# Patient Record
Sex: FEMALE | Race: WHITE | NOT HISPANIC OR LATINO | Employment: UNEMPLOYED | URBAN - NONMETROPOLITAN AREA
[De-identification: names, ages, dates, MRNs, and addresses within clinical notes are randomized per-mention and may not be internally consistent; named-entity substitution may affect disease eponyms.]

---

## 2017-02-22 ENCOUNTER — HOSPITAL ENCOUNTER (OUTPATIENT)
Dept: GENERAL RADIOLOGY | Facility: HOSPITAL | Age: 67
Discharge: HOME | End: 2017-02-22
Admitting: NURSE PRACTITIONER

## 2017-02-22 ENCOUNTER — HOSPITAL ENCOUNTER (OUTPATIENT)
Dept: RESPIRATORY THERAPY | Facility: HOSPITAL | Age: 67
Discharge: HOME | End: 2017-02-22

## 2017-02-22 ENCOUNTER — TRANSCRIBE ORDERS (OUTPATIENT)
Dept: ADMINISTRATIVE | Facility: HOSPITAL | Age: 67
End: 2017-02-22

## 2017-02-22 DIAGNOSIS — R05.9 COUGH: ICD-10-CM

## 2017-02-22 DIAGNOSIS — R07.9 CHEST PAIN, UNSPECIFIED: ICD-10-CM

## 2017-02-22 DIAGNOSIS — R05.9 COUGH: Primary | ICD-10-CM

## 2017-02-22 PROCEDURE — 71020 HC CHEST PA AND LATERAL: CPT

## 2017-02-22 PROCEDURE — 93005 ELECTROCARDIOGRAM TRACING: CPT | Performed by: NURSE PRACTITIONER

## 2017-02-22 PROCEDURE — 71020 XR CHEST PA AND LATERAL: CPT | Performed by: RADIOLOGY

## 2020-02-12 ENCOUNTER — APPOINTMENT (OUTPATIENT)
Dept: GENERAL RADIOLOGY | Facility: HOSPITAL | Age: 70
End: 2020-02-12

## 2020-02-12 ENCOUNTER — APPOINTMENT (OUTPATIENT)
Dept: CT IMAGING | Facility: HOSPITAL | Age: 70
End: 2020-02-12

## 2020-02-12 ENCOUNTER — HOSPITAL ENCOUNTER (EMERGENCY)
Facility: HOSPITAL | Age: 70
Discharge: HOME | End: 2020-02-12
Attending: FAMILY MEDICINE | Admitting: FAMILY MEDICINE

## 2020-02-12 VITALS
OXYGEN SATURATION: 100 % | RESPIRATION RATE: 16 BRPM | TEMPERATURE: 98.7 F | SYSTOLIC BLOOD PRESSURE: 132 MMHG | WEIGHT: 170 LBS | BODY MASS INDEX: 31.28 KG/M2 | HEART RATE: 86 BPM | DIASTOLIC BLOOD PRESSURE: 67 MMHG | HEIGHT: 62 IN

## 2020-02-12 DIAGNOSIS — J10.1 INFLUENZA A: Primary | ICD-10-CM

## 2020-02-12 DIAGNOSIS — R91.8 PULMONARY NODULES: ICD-10-CM

## 2020-02-12 LAB
ALBUMIN SERPL-MCNC: 3.85 G/DL (ref 3.5–5.2)
ALBUMIN/GLOB SERPL: 1.2 G/DL
ALP SERPL-CCNC: 77 U/L (ref 39–117)
ALT SERPL W P-5'-P-CCNC: 10 U/L (ref 1–33)
ANION GAP SERPL CALCULATED.3IONS-SCNC: 16 MMOL/L (ref 5–15)
AST SERPL-CCNC: 17 U/L (ref 1–32)
B PERT DNA SPEC QL NAA+PROBE: NOT DETECTED
BASOPHILS # BLD AUTO: 0.01 10*3/MM3 (ref 0–0.2)
BASOPHILS NFR BLD AUTO: 0.2 % (ref 0–1.5)
BILIRUB SERPL-MCNC: 0.6 MG/DL (ref 0.2–1.2)
BUN BLD-MCNC: 13 MG/DL (ref 8–23)
BUN/CREAT SERPL: 9.7 (ref 7–25)
C PNEUM DNA NPH QL NAA+NON-PROBE: NOT DETECTED
CALCIUM SPEC-SCNC: 9.4 MG/DL (ref 8.6–10.5)
CHLORIDE SERPL-SCNC: 102 MMOL/L (ref 98–107)
CO2 SERPL-SCNC: 21 MMOL/L (ref 22–29)
CREAT BLD-MCNC: 1.34 MG/DL (ref 0.57–1)
CRP SERPL-MCNC: 0.91 MG/DL (ref 0–0.5)
DEPRECATED RDW RBC AUTO: 41.7 FL (ref 37–54)
EOSINOPHIL # BLD AUTO: 0 10*3/MM3 (ref 0–0.4)
EOSINOPHIL NFR BLD AUTO: 0 % (ref 0.3–6.2)
ERYTHROCYTE [DISTWIDTH] IN BLOOD BY AUTOMATED COUNT: 12.5 % (ref 12.3–15.4)
FLUAV H1 2009 PAND RNA NPH QL NAA+PROBE: DETECTED
FLUAV H1 HA GENE NPH QL NAA+PROBE: NOT DETECTED
FLUAV H3 RNA NPH QL NAA+PROBE: NOT DETECTED
FLUAV SUBTYP SPEC NAA+PROBE: NOT DETECTED
FLUBV RNA ISLT QL NAA+PROBE: NOT DETECTED
GFR SERPL CREATININE-BSD FRML MDRD: 39 ML/MIN/1.73
GLOBULIN UR ELPH-MCNC: 3.2 GM/DL
GLUCOSE BLD-MCNC: 127 MG/DL (ref 65–99)
HADV DNA SPEC NAA+PROBE: NOT DETECTED
HCOV 229E RNA SPEC QL NAA+PROBE: NOT DETECTED
HCOV HKU1 RNA SPEC QL NAA+PROBE: NOT DETECTED
HCOV NL63 RNA SPEC QL NAA+PROBE: NOT DETECTED
HCOV OC43 RNA SPEC QL NAA+PROBE: NOT DETECTED
HCT VFR BLD AUTO: 35.6 % (ref 34–46.6)
HGB BLD-MCNC: 11.8 G/DL (ref 12–15.9)
HMPV RNA NPH QL NAA+NON-PROBE: NOT DETECTED
HOLD SPECIMEN: NORMAL
HOLD SPECIMEN: NORMAL
HPIV1 RNA SPEC QL NAA+PROBE: NOT DETECTED
HPIV2 RNA SPEC QL NAA+PROBE: NOT DETECTED
HPIV3 RNA NPH QL NAA+PROBE: NOT DETECTED
HPIV4 P GENE NPH QL NAA+PROBE: NOT DETECTED
IMM GRANULOCYTES # BLD AUTO: 0.02 10*3/MM3 (ref 0–0.05)
IMM GRANULOCYTES NFR BLD AUTO: 0.4 % (ref 0–0.5)
LYMPHOCYTES # BLD AUTO: 0.31 10*3/MM3 (ref 0.7–3.1)
LYMPHOCYTES NFR BLD AUTO: 6.2 % (ref 19.6–45.3)
M PNEUMO IGG SER IA-ACNC: NOT DETECTED
MCH RBC QN AUTO: 30.1 PG (ref 26.6–33)
MCHC RBC AUTO-ENTMCNC: 33.1 G/DL (ref 31.5–35.7)
MCV RBC AUTO: 90.8 FL (ref 79–97)
MONOCYTES # BLD AUTO: 0.13 10*3/MM3 (ref 0.1–0.9)
MONOCYTES NFR BLD AUTO: 2.6 % (ref 5–12)
NEUTROPHILS # BLD AUTO: 4.53 10*3/MM3 (ref 1.7–7)
NEUTROPHILS NFR BLD AUTO: 90.6 % (ref 42.7–76)
NRBC BLD AUTO-RTO: 0 /100 WBC (ref 0–0.2)
NT-PROBNP SERPL-MCNC: 1334 PG/ML (ref 5–900)
PLATELET # BLD AUTO: 128 10*3/MM3 (ref 140–450)
PMV BLD AUTO: 9.7 FL (ref 6–12)
POTASSIUM BLD-SCNC: 3.2 MMOL/L (ref 3.5–5.2)
PROT SERPL-MCNC: 7 G/DL (ref 6–8.5)
RBC # BLD AUTO: 3.92 10*6/MM3 (ref 3.77–5.28)
RHINOVIRUS RNA SPEC NAA+PROBE: NOT DETECTED
RSV RNA NPH QL NAA+NON-PROBE: NOT DETECTED
SODIUM BLD-SCNC: 139 MMOL/L (ref 136–145)
TROPONIN T SERPL-MCNC: <0.01 NG/ML (ref 0–0.03)
WBC NRBC COR # BLD: 5 10*3/MM3 (ref 3.4–10.8)
WHOLE BLOOD HOLD SPECIMEN: NORMAL
WHOLE BLOOD HOLD SPECIMEN: NORMAL

## 2020-02-12 PROCEDURE — 93005 ELECTROCARDIOGRAM TRACING: CPT | Performed by: EMERGENCY MEDICINE

## 2020-02-12 PROCEDURE — 80053 COMPREHEN METABOLIC PANEL: CPT | Performed by: FAMILY MEDICINE

## 2020-02-12 PROCEDURE — 0099U HC BIOFIRE FILMARRAY RESP PANEL 1: CPT | Performed by: FAMILY MEDICINE

## 2020-02-12 PROCEDURE — 84484 ASSAY OF TROPONIN QUANT: CPT | Performed by: FAMILY MEDICINE

## 2020-02-12 PROCEDURE — 96360 HYDRATION IV INFUSION INIT: CPT

## 2020-02-12 PROCEDURE — 99285 EMERGENCY DEPT VISIT HI MDM: CPT

## 2020-02-12 PROCEDURE — 71046 X-RAY EXAM CHEST 2 VIEWS: CPT | Performed by: RADIOLOGY

## 2020-02-12 PROCEDURE — 93010 ELECTROCARDIOGRAM REPORT: CPT | Performed by: INTERNAL MEDICINE

## 2020-02-12 PROCEDURE — 85025 COMPLETE CBC W/AUTO DIFF WBC: CPT | Performed by: EMERGENCY MEDICINE

## 2020-02-12 PROCEDURE — 71250 CT THORAX DX C-: CPT

## 2020-02-12 PROCEDURE — 83880 ASSAY OF NATRIURETIC PEPTIDE: CPT | Performed by: FAMILY MEDICINE

## 2020-02-12 PROCEDURE — 86140 C-REACTIVE PROTEIN: CPT | Performed by: FAMILY MEDICINE

## 2020-02-12 PROCEDURE — 93005 ELECTROCARDIOGRAM TRACING: CPT | Performed by: FAMILY MEDICINE

## 2020-02-12 PROCEDURE — 71046 X-RAY EXAM CHEST 2 VIEWS: CPT

## 2020-02-12 PROCEDURE — 96361 HYDRATE IV INFUSION ADD-ON: CPT

## 2020-02-12 RX ORDER — SODIUM CHLORIDE 9 MG/ML
125 INJECTION, SOLUTION INTRAVENOUS CONTINUOUS
Status: DISCONTINUED | OUTPATIENT
Start: 2020-02-12 | End: 2020-02-13 | Stop reason: HOSPADM

## 2020-02-12 RX ORDER — SODIUM CHLORIDE 0.9 % (FLUSH) 0.9 %
10 SYRINGE (ML) INJECTION AS NEEDED
Status: DISCONTINUED | OUTPATIENT
Start: 2020-02-12 | End: 2020-02-13 | Stop reason: HOSPADM

## 2020-02-12 RX ORDER — ACETAMINOPHEN 500 MG
1000 TABLET ORAL ONCE
Status: COMPLETED | OUTPATIENT
Start: 2020-02-12 | End: 2020-02-12

## 2020-02-12 RX ADMIN — SODIUM CHLORIDE 125 ML/HR: 9 INJECTION, SOLUTION INTRAVENOUS at 20:33

## 2020-02-12 RX ADMIN — ACETAMINOPHEN 1000 MG: 500 TABLET ORAL at 20:28

## 2020-02-13 NOTE — ED PROVIDER NOTES
Subjective     History provided by:  Patient  Shortness of Breath   Severity:  Moderate  Onset quality:  Sudden  Timing:  Intermittent  Progression:  Waxing and waning  Chronicity:  New  Context: activity and URI    Relieved by:  Nothing  Worsened by:  Coughing and activity  Ineffective treatments:  Rest  Associated symptoms: no abdominal pain, no chest pain and no fever        Review of Systems   Constitutional: Negative.  Negative for fever.   HENT: Negative.    Respiratory: Positive for shortness of breath.    Cardiovascular: Negative.  Negative for chest pain.   Gastrointestinal: Negative.  Negative for abdominal pain.   Endocrine: Negative.    Genitourinary: Negative.  Negative for dysuria.   Skin: Negative.    Neurological: Negative.    Psychiatric/Behavioral: Negative.    All other systems reviewed and are negative.      No past medical history on file.    Allergies   Allergen Reactions   • Contrast Dye Anaphylaxis   • Sulfa Antibiotics Anaphylaxis       No past surgical history on file.    No family history on file.    Social History     Socioeconomic History   • Marital status:      Spouse name: Not on file   • Number of children: Not on file   • Years of education: Not on file   • Highest education level: Not on file           Objective   Physical Exam   Constitutional: She is oriented to person, place, and time. She appears well-developed and well-nourished.   HENT:   Head: Normocephalic and atraumatic.   Right Ear: External ear normal.   Left Ear: External ear normal.   Nose: Nose normal.   Mouth/Throat: Oropharynx is clear and moist.   Eyes: Pupils are equal, round, and reactive to light. EOM are normal.   Neck: Neck supple.   Cardiovascular: Normal rate and regular rhythm.   Pulmonary/Chest: Effort normal and breath sounds normal.   Abdominal: Soft. Bowel sounds are normal.   Musculoskeletal: Normal range of motion.   Neurological: She is alert and oriented to person, place, and time.   Skin:  Skin is warm. Capillary refill takes less than 2 seconds.   Psychiatric: She has a normal mood and affect. Her behavior is normal. Judgment and thought content normal.   Nursing note and vitals reviewed.      Procedures           ED Course  ED Course as of Feb 14 0404 Wed Feb 12, 2020 2121 EKG interpretation time is is 1940 normal sinus rhythm 85 bpm right axis deviation QRS duration is 74 QT is 384 QTC is 456 nonspecific T wave changes are noted no evidence of acute ST elevation    [MH]      ED Course User Index  [MH] Bridgette Andrews DO                                           [facility]  Number of Diagnoses or Management Options  Influenza A: new and requires workup  Pulmonary nodules: new and requires workup     Amount and/or Complexity of Data Reviewed  Clinical lab tests: ordered and reviewed  Tests in the radiology section of CPT®: reviewed and ordered  Tests in the medicine section of CPT®: ordered and reviewed  Independent visualization of images, tracings, or specimens: yes    Risk of Complications, Morbidity, and/or Mortality  Presenting problems: moderate  Diagnostic procedures: moderate  Management options: moderate    Patient Progress  Patient progress: stable      Final diagnoses:   Influenza A   Pulmonary nodules            Bridgette Andrews DO  02/14/20 4121

## 2020-02-15 ENCOUNTER — APPOINTMENT (OUTPATIENT)
Dept: GENERAL RADIOLOGY | Facility: HOSPITAL | Age: 70
End: 2020-02-15

## 2020-02-15 ENCOUNTER — HOSPITAL ENCOUNTER (EMERGENCY)
Facility: HOSPITAL | Age: 70
Discharge: HOME | End: 2020-02-15
Attending: FAMILY MEDICINE | Admitting: FAMILY MEDICINE

## 2020-02-15 VITALS
HEIGHT: 62 IN | DIASTOLIC BLOOD PRESSURE: 76 MMHG | WEIGHT: 170 LBS | HEART RATE: 55 BPM | OXYGEN SATURATION: 99 % | SYSTOLIC BLOOD PRESSURE: 162 MMHG | RESPIRATION RATE: 20 BRPM | TEMPERATURE: 100.9 F | BODY MASS INDEX: 31.28 KG/M2

## 2020-02-15 DIAGNOSIS — J11.1 INFLUENZA: ICD-10-CM

## 2020-02-15 DIAGNOSIS — R06.00 DYSPNEA, UNSPECIFIED TYPE: ICD-10-CM

## 2020-02-15 DIAGNOSIS — E87.6 HYPOKALEMIA: Primary | ICD-10-CM

## 2020-02-15 DIAGNOSIS — R19.7 DIARRHEA, UNSPECIFIED TYPE: ICD-10-CM

## 2020-02-15 DIAGNOSIS — N28.9 RENAL INSUFFICIENCY: ICD-10-CM

## 2020-02-15 LAB
027 TOXIN: NORMAL
A-A DO2: 18.6 MMHG (ref 0–300)
ADV 40+41 DNA STL QL NAA+NON-PROBE: NOT DETECTED
ALBUMIN SERPL-MCNC: 3.46 G/DL (ref 3.5–5.2)
ALBUMIN/GLOB SERPL: 1.2 G/DL
ALP SERPL-CCNC: 68 U/L (ref 39–117)
ALT SERPL W P-5'-P-CCNC: 15 U/L (ref 1–33)
ANION GAP SERPL CALCULATED.3IONS-SCNC: 10.8 MMOL/L (ref 5–15)
ARTERIAL PATENCY WRIST A: ABNORMAL
AST SERPL-CCNC: 26 U/L (ref 1–32)
ASTRO TYP 1-8 RNA STL QL NAA+NON-PROBE: NOT DETECTED
ATMOSPHERIC PRESS: 735 MMHG
BASE EXCESS BLDA CALC-SCNC: 0 MMOL/L (ref 0–2)
BASOPHILS # BLD AUTO: 0.01 10*3/MM3 (ref 0–0.2)
BASOPHILS NFR BLD AUTO: 0.3 % (ref 0–1.5)
BDY SITE: ABNORMAL
BILIRUB SERPL-MCNC: 0.5 MG/DL (ref 0.2–1.2)
BILIRUB UR QL STRIP: NEGATIVE
BODY TEMPERATURE: 0 C
BUN BLD-MCNC: 13 MG/DL (ref 8–23)
BUN/CREAT SERPL: 8.9 (ref 7–25)
C CAYETANENSIS DNA STL QL NAA+NON-PROBE: NOT DETECTED
C DIFF TOX GENS STL QL NAA+PROBE: NEGATIVE
CALCIUM SPEC-SCNC: 8.5 MG/DL (ref 8.6–10.5)
CAMPY SP DNA.DIARRHEA STL QL NAA+PROBE: NOT DETECTED
CHLORIDE SERPL-SCNC: 105 MMOL/L (ref 98–107)
CLARITY UR: CLEAR
CO2 BLDA-SCNC: 22.4 MMOL/L (ref 22–33)
CO2 SERPL-SCNC: 22.2 MMOL/L (ref 22–29)
COHGB MFR BLD: 0.4 % (ref 0–5)
COLOR UR: YELLOW
CREAT BLD-MCNC: 1.46 MG/DL (ref 0.57–1)
CRYPTOSP STL CULT: NOT DETECTED
DEPRECATED RDW RBC AUTO: 41.8 FL (ref 37–54)
E COLI DNA SPEC QL NAA+PROBE: NOT DETECTED
E HISTOLYT AG STL-ACNC: NOT DETECTED
EAEC PAA PLAS AGGR+AATA ST NAA+NON-PRB: NOT DETECTED
EC STX1 + STX2 GENES STL NAA+PROBE: NOT DETECTED
EOSINOPHIL # BLD AUTO: 0 10*3/MM3 (ref 0–0.4)
EOSINOPHIL NFR BLD AUTO: 0 % (ref 0.3–6.2)
EPEC EAE GENE STL QL NAA+NON-PROBE: NOT DETECTED
ERYTHROCYTE [DISTWIDTH] IN BLOOD BY AUTOMATED COUNT: 12.7 % (ref 12.3–15.4)
ETEC LTA+ST1A+ST1B TOX ST NAA+NON-PROBE: NOT DETECTED
G LAMBLIA DNA SPEC QL NAA+PROBE: NOT DETECTED
GFR SERPL CREATININE-BSD FRML MDRD: 36 ML/MIN/1.73
GLOBULIN UR ELPH-MCNC: 2.9 GM/DL
GLUCOSE BLD-MCNC: 101 MG/DL (ref 65–99)
GLUCOSE UR STRIP-MCNC: NEGATIVE MG/DL
HCO3 BLDA-SCNC: 21.6 MMOL/L (ref 20–26)
HCT VFR BLD AUTO: 34.5 % (ref 34–46.6)
HCT VFR BLD CALC: 35 % (ref 38–51)
HGB BLD-MCNC: 11.5 G/DL (ref 12–15.9)
HGB BLDA-MCNC: 11.4 G/DL (ref 13.5–17.5)
HGB UR QL STRIP.AUTO: NEGATIVE
HOLD SPECIMEN: NORMAL
HOROWITZ INDEX BLD+IHG-RTO: 21 %
IMM GRANULOCYTES # BLD AUTO: 0.01 10*3/MM3 (ref 0–0.05)
IMM GRANULOCYTES NFR BLD AUTO: 0.3 % (ref 0–0.5)
KETONES UR QL STRIP: NEGATIVE
LEUKOCYTE ESTERASE UR QL STRIP.AUTO: NEGATIVE
LYMPHOCYTES # BLD AUTO: 0.93 10*3/MM3 (ref 0.7–3.1)
LYMPHOCYTES NFR BLD AUTO: 25.1 % (ref 19.6–45.3)
Lab: ABNORMAL
MCH RBC QN AUTO: 30.3 PG (ref 26.6–33)
MCHC RBC AUTO-ENTMCNC: 33.3 G/DL (ref 31.5–35.7)
MCV RBC AUTO: 90.8 FL (ref 79–97)
METHGB BLD QL: 0.4 % (ref 0–3)
MODALITY: ABNORMAL
MONOCYTES # BLD AUTO: 0.51 10*3/MM3 (ref 0.1–0.9)
MONOCYTES NFR BLD AUTO: 13.8 % (ref 5–12)
NEUTROPHILS # BLD AUTO: 2.24 10*3/MM3 (ref 1.7–7)
NEUTROPHILS NFR BLD AUTO: 60.5 % (ref 42.7–76)
NITRITE UR QL STRIP: NEGATIVE
NOROVIRUS GI+II RNA STL QL NAA+NON-PROBE: NOT DETECTED
NOTE: ABNORMAL
NRBC BLD AUTO-RTO: 0 /100 WBC (ref 0–0.2)
NT-PROBNP SERPL-MCNC: 1227 PG/ML (ref 5–900)
OXYHGB MFR BLDV: 97.8 % (ref 94–99)
P SHIGELLOIDES DNA STL QL NAA+PROBE: NOT DETECTED
PCO2 BLDA: 25.7 MM HG (ref 35–45)
PCO2 TEMP ADJ BLD: ABNORMAL MM[HG]
PH BLDA: 7.53 PH UNITS (ref 7.35–7.45)
PH UR STRIP.AUTO: 5.5 [PH] (ref 5–8)
PH, TEMP CORRECTED: ABNORMAL
PLATELET # BLD AUTO: 143 10*3/MM3 (ref 140–450)
PMV BLD AUTO: 10.7 FL (ref 6–12)
PO2 BLDA: 96.8 MM HG (ref 83–108)
PO2 TEMP ADJ BLD: ABNORMAL MM[HG]
POTASSIUM BLD-SCNC: 2.9 MMOL/L (ref 3.5–5.2)
PROT SERPL-MCNC: 6.4 G/DL (ref 6–8.5)
PROT UR QL STRIP: NEGATIVE
RBC # BLD AUTO: 3.8 10*6/MM3 (ref 3.77–5.28)
RV RNA STL NAA+PROBE: NOT DETECTED
SALMONELLA DNA SPEC QL NAA+PROBE: NOT DETECTED
SAO2 % BLDCOA: 98.6 % (ref 94–99)
SAPO I+II+IV+V RNA STL QL NAA+NON-PROBE: NOT DETECTED
SHIGELLA SP+EIEC IPAH STL QL NAA+PROBE: NOT DETECTED
SODIUM BLD-SCNC: 138 MMOL/L (ref 136–145)
SP GR UR STRIP: 1.02 (ref 1–1.03)
TROPONIN T SERPL-MCNC: <0.01 NG/ML (ref 0–0.03)
TROPONIN T SERPL-MCNC: <0.01 NG/ML (ref 0–0.03)
UROBILINOGEN UR QL STRIP: NORMAL
V CHOLERAE DNA SPEC QL NAA+PROBE: NOT DETECTED
VENTILATOR MODE: ABNORMAL
VIBRIO DNA SPEC NAA+PROBE: NOT DETECTED
WBC NRBC COR # BLD: 3.7 10*3/MM3 (ref 3.4–10.8)
WHOLE BLOOD HOLD SPECIMEN: NORMAL
YERSINIA STL CULT: NOT DETECTED

## 2020-02-15 PROCEDURE — 80053 COMPREHEN METABOLIC PANEL: CPT | Performed by: PHYSICIAN ASSISTANT

## 2020-02-15 PROCEDURE — 99284 EMERGENCY DEPT VISIT MOD MDM: CPT

## 2020-02-15 PROCEDURE — 71046 X-RAY EXAM CHEST 2 VIEWS: CPT | Performed by: RADIOLOGY

## 2020-02-15 PROCEDURE — 83880 ASSAY OF NATRIURETIC PEPTIDE: CPT | Performed by: PHYSICIAN ASSISTANT

## 2020-02-15 PROCEDURE — 93010 ELECTROCARDIOGRAM REPORT: CPT | Performed by: SPECIALIST

## 2020-02-15 PROCEDURE — 36600 WITHDRAWAL OF ARTERIAL BLOOD: CPT

## 2020-02-15 PROCEDURE — 0097U HC BIOFIRE FILMARRAY GI PANEL: CPT | Performed by: PHYSICIAN ASSISTANT

## 2020-02-15 PROCEDURE — 87493 C DIFF AMPLIFIED PROBE: CPT | Performed by: PHYSICIAN ASSISTANT

## 2020-02-15 PROCEDURE — 83050 HGB METHEMOGLOBIN QUAN: CPT

## 2020-02-15 PROCEDURE — 71046 X-RAY EXAM CHEST 2 VIEWS: CPT

## 2020-02-15 PROCEDURE — 84484 ASSAY OF TROPONIN QUANT: CPT | Performed by: PHYSICIAN ASSISTANT

## 2020-02-15 PROCEDURE — 93005 ELECTROCARDIOGRAM TRACING: CPT | Performed by: PHYSICIAN ASSISTANT

## 2020-02-15 PROCEDURE — 82375 ASSAY CARBOXYHB QUANT: CPT

## 2020-02-15 PROCEDURE — 85025 COMPLETE CBC W/AUTO DIFF WBC: CPT | Performed by: PHYSICIAN ASSISTANT

## 2020-02-15 PROCEDURE — 81003 URINALYSIS AUTO W/O SCOPE: CPT | Performed by: FAMILY MEDICINE

## 2020-02-15 PROCEDURE — 82805 BLOOD GASES W/O2 SATURATION: CPT

## 2020-02-15 RX ORDER — ACETAMINOPHEN 500 MG
1000 TABLET ORAL ONCE
Status: COMPLETED | OUTPATIENT
Start: 2020-02-15 | End: 2020-02-15

## 2020-02-15 RX ORDER — FLUTICASONE PROPIONATE 110 UG/1
1 AEROSOL, METERED RESPIRATORY (INHALATION)
Qty: 12 G | Refills: 0 | Status: SHIPPED | OUTPATIENT
Start: 2020-02-15

## 2020-02-15 RX ORDER — POTASSIUM CHLORIDE 20 MEQ/1
20 TABLET, EXTENDED RELEASE ORAL DAILY
Qty: 2 TABLET | Refills: 0 | Status: SHIPPED | OUTPATIENT
Start: 2020-02-15

## 2020-02-15 RX ORDER — POTASSIUM CHLORIDE 20 MEQ/1
40 TABLET, EXTENDED RELEASE ORAL ONCE
Status: COMPLETED | OUTPATIENT
Start: 2020-02-15 | End: 2020-02-15

## 2020-02-15 RX ADMIN — POTASSIUM CHLORIDE 40 MEQ: 1500 TABLET, EXTENDED RELEASE ORAL at 15:15

## 2020-02-15 RX ADMIN — ACETAMINOPHEN 1000 MG: 500 TABLET ORAL at 16:11

## 2020-02-15 NOTE — ED NOTES
Discharging patient and patient had a fever of 100.9 provider aware and orders placed.     Donald Jennings RN  02/15/20 2526

## 2020-02-15 NOTE — ED NOTES
Discharge instructions reviewed with patient, patient instructed to return to ED if symptoms worsen or if any new problems arise. Patient verbalizes understanding of discharge instructions, patient ambulatory out of ED. No acute distress noted.     Donald Jennings RN  02/15/20 4998

## 2020-02-15 NOTE — ED PROVIDER NOTES
Subjective   69-year-old white female presents secondary to increased shortness of breath.  Patient states that she has had problems intermittently over the past 6 weeks that this is progressively gotten worse.  She recently was diagnosed with influenza A on Wednesday.  She had a fever of 104 at that time.  She states that she had a nonproductive cough with some wheezing.  She was recently on steroids.  States that she is also had diarrhea recently and has had multiple episodes per day.  She states that she had C. difficile and her last treatment was approximately 1 year ago.  No abdominal pain.  No other complaints at this time.          Review of Systems   Constitutional: Negative.  Negative for fever.   HENT: Negative.    Respiratory: Positive for cough.    Cardiovascular: Negative.  Negative for chest pain.   Gastrointestinal: Positive for diarrhea. Negative for abdominal pain.   Endocrine: Negative.    Genitourinary: Negative.  Negative for dysuria.   Skin: Negative.    Neurological: Negative.    Psychiatric/Behavioral: Negative.    All other systems reviewed and are negative.      No past medical history on file.    Allergies   Allergen Reactions   • Contrast Dye Anaphylaxis   • Sulfa Antibiotics Anaphylaxis       No past surgical history on file.    No family history on file.    Social History     Socioeconomic History   • Marital status:      Spouse name: Not on file   • Number of children: Not on file   • Years of education: Not on file   • Highest education level: Not on file           Objective   Physical Exam   Constitutional: She is oriented to person, place, and time. She appears well-developed and well-nourished. No distress.   HENT:   Head: Normocephalic and atraumatic.   Right Ear: External ear normal.   Left Ear: External ear normal.   Nose: Nose normal.   Eyes: Pupils are equal, round, and reactive to light. Conjunctivae and EOM are normal.   Neck: Normal range of motion. Neck supple. No  JVD present. No tracheal deviation present.   Cardiovascular: Normal rate, regular rhythm and normal heart sounds.   No murmur heard.  Pulmonary/Chest: Effort normal and breath sounds normal. No respiratory distress. She has no wheezes.   Abdominal: Soft. Bowel sounds are normal. There is no tenderness.   Musculoskeletal: Normal range of motion. She exhibits no edema or deformity.   Neurological: She is alert and oriented to person, place, and time. No cranial nerve deficit.   Skin: Skin is warm and dry. No rash noted. She is not diaphoretic. No erythema. No pallor.   Psychiatric: She has a normal mood and affect. Her behavior is normal. Thought content normal.   Nursing note and vitals reviewed.      Procedures           ED Course  ED Course as of Feb 15 1824   Sat Feb 15, 2020   1524 D/w Dr Adriane merino. Agrees with plan.  Patient has never had any chest pain pressure tightness or squeezing.  She states that her shortness of breath is been over the past 6 weeks.  Her fever has improved at this point.    [JI]      ED Course User Index  [JI] Willian Santillan PA                                           [facility]  Number of Diagnoses or Management Options  Diarrhea, unspecified type: new and requires workup  Dyspnea, unspecified type: new and requires workup  Hypokalemia: new and requires workup  Influenza: new and requires workup  Renal insufficiency: new and requires workup     Amount and/or Complexity of Data Reviewed  Clinical lab tests: reviewed and ordered  Tests in the radiology section of CPT®: reviewed and ordered  Tests in the medicine section of CPT®: reviewed and ordered  Decide to obtain previous medical records or to obtain history from someone other than the patient: yes  Discuss the patient with other providers: yes  Independent visualization of images, tracings, or specimens: yes    Risk of Complications, Morbidity, and/or Mortality  Presenting problems: moderate        Final diagnoses:    Hypokalemia   Diarrhea, unspecified type   Dyspnea, unspecified type   Renal insufficiency   Influenza            Willian Santillan PA  02/15/20 182

## 2020-05-03 ENCOUNTER — NURSE TRIAGE (OUTPATIENT)
Dept: CALL CENTER | Facility: HOSPITAL | Age: 70
End: 2020-05-03

## 2020-05-03 NOTE — TELEPHONE ENCOUNTER
"B/p in the 70's earlier today. Asked her to take b/p during call SBP now 90 with HR 70's. Just feels tired. Denies dizziness or lightheadedness. Earlier today felt like she had to blink her eyes. Did not take her noon dose of B/p medication because B/P was so low. Advised to Call MD if unable to reach  MD to go in to the ER for evaluation. Discussed need for adjustments of medications. Caller says she has eaten and drank today Ate breakfast and lunch and has drank. No S/s dehydration. Care advice per guideline.    Reason for Disposition  • [1] Systolic BP < 90 AND [2] NOT dizzy, lightheaded or weak    Additional Information  • Negative: Started suddenly after an allergic medicine, an allergic food, or bee sting  • Negative: Shock suspected (e.g., cold/pale/clammy skin, too weak to stand, low BP, rapid pulse)  • Negative: Difficult to awaken or acting confused (e.g., disoriented, slurred speech)  • Negative: Fainted  • Negative: [1] Systolic BP < 90 AND [2] dizzy, lightheaded, or weak  • Negative: Chest pain  • Negative: Bleeding (e.g., vomiting blood, rectal bleeding or tarry stools, severe vaginal bleeding)(Exception: fainted from sight of small amount of blood; small cut or abrasion)  • Negative: Extra heart beats or heart is beating fast  (i.e., \"palpitations\")  • Negative: Sounds like a life-threatening emergency to the triager  • Negative: [1] Systolic BP < 80 AND [2] NOT dizzy, lightheaded or weak  • Negative: Abdominal pain  • Negative: Fever > 100.5 F (38.1 C)  • Negative: Major surgery in the past month  • Negative: [1] Drinking very little AND [2] dehydration suspected (e.g., no urine > 12 hours, very dry mouth, very lightheaded)  • Negative: [1] Fall in systolic BP > 20 mm Hg from normal AND [2] dizzy, lightheaded, or weak  • Negative: Patient sounds very sick or weak to the triager    Answer Assessment - Initial Assessment Questions  1. BLOOD PRESSURE: \"What is the blood pressure?\" \"Did you take at least " "two measurements 5 minutes apart?\"     10:30am 70/45   2:05pm 72/48 HR 42   During call 92/63 HR 74  2. ONSET: \"When did you take your blood pressure?\"    today  3. HOW: \"How did you obtain the blood pressure?\" (e.g., visiting nurse, automatic home BP monitor)     Automatic cuff  4. HISTORY: \"Do you have a history of low blood pressure?\" \"What is your blood pressure normally?\"     114/85  157/86  5. MEDICATIONS: \"Are you taking any medications for blood pressure?\" If yes: \"Have they been changed recently?\"  Started new B/P medication, Hydralazine 25 mg tid. Also takes Losartan 100 mg daily Coreg 25 mg BID  6. PULSE RATE: \"Do you know what your pulse rate is?\"   74 at present  7. OTHER SYMPTOMS: \"Have you been sick recently?\" \"Have you had a recent injury?\"    no  8. PREGNANCY: \"Is there any chance you are pregnant?\" \"When was your last menstrual period?\"   na    Protocols used: LOW BLOOD PRESSURE-ADULT-AH      "

## 2020-07-23 ENCOUNTER — LAB (OUTPATIENT)
Dept: LAB | Facility: HOSPITAL | Age: 70
End: 2020-07-23

## 2020-07-23 ENCOUNTER — TRANSCRIBE ORDERS (OUTPATIENT)
Dept: LAB | Facility: HOSPITAL | Age: 70
End: 2020-07-23

## 2020-07-23 DIAGNOSIS — Z79.891 ENCOUNTER FOR LONG-TERM METHADONE USE: Primary | ICD-10-CM

## 2020-07-23 DIAGNOSIS — Z79.891 ENCOUNTER FOR LONG-TERM METHADONE USE: ICD-10-CM

## 2020-07-23 LAB
ALBUMIN SERPL-MCNC: 3.6 G/DL (ref 3.5–5.2)
ALBUMIN/GLOB SERPL: 1.2 G/DL
ALP SERPL-CCNC: 69 U/L (ref 39–117)
ALT SERPL W P-5'-P-CCNC: 7 U/L (ref 1–33)
ANION GAP SERPL CALCULATED.3IONS-SCNC: 10.3 MMOL/L (ref 5–15)
AST SERPL-CCNC: 11 U/L (ref 1–32)
BILIRUB SERPL-MCNC: 0.5 MG/DL (ref 0–1.2)
BUN SERPL-MCNC: 16 MG/DL (ref 8–23)
BUN/CREAT SERPL: 8 (ref 7–25)
CALCIUM SPEC-SCNC: 9.4 MG/DL (ref 8.6–10.5)
CHLORIDE SERPL-SCNC: 103 MMOL/L (ref 98–107)
CO2 SERPL-SCNC: 24.7 MMOL/L (ref 22–29)
CREAT SERPL-MCNC: 2.01 MG/DL (ref 0.57–1)
GFR SERPL CREATININE-BSD FRML MDRD: 25 ML/MIN/1.73
GLOBULIN UR ELPH-MCNC: 3.1 GM/DL
GLUCOSE SERPL-MCNC: 83 MG/DL (ref 65–99)
POTASSIUM SERPL-SCNC: 4.2 MMOL/L (ref 3.5–5.2)
PROT SERPL-MCNC: 6.7 G/DL (ref 6–8.5)
SODIUM SERPL-SCNC: 138 MMOL/L (ref 136–145)

## 2020-07-23 PROCEDURE — 80053 COMPREHEN METABOLIC PANEL: CPT

## 2020-07-23 PROCEDURE — 36415 COLL VENOUS BLD VENIPUNCTURE: CPT

## 2020-08-11 ENCOUNTER — TRANSCRIBE ORDERS (OUTPATIENT)
Dept: ADMINISTRATIVE | Facility: HOSPITAL | Age: 70
End: 2020-08-11

## 2020-08-11 ENCOUNTER — HOSPITAL ENCOUNTER (OUTPATIENT)
Dept: GENERAL RADIOLOGY | Facility: HOSPITAL | Age: 70
Discharge: HOME | End: 2020-08-11

## 2020-08-11 ENCOUNTER — HOSPITAL ENCOUNTER (OUTPATIENT)
Dept: GENERAL RADIOLOGY | Facility: HOSPITAL | Age: 70
Discharge: HOME | End: 2020-08-11
Admitting: NURSE PRACTITIONER

## 2020-08-11 DIAGNOSIS — R06.00 DYSPNEA, UNSPECIFIED TYPE: ICD-10-CM

## 2020-08-11 DIAGNOSIS — M25.512 LEFT SHOULDER PAIN, UNSPECIFIED CHRONICITY: ICD-10-CM

## 2020-08-11 DIAGNOSIS — M25.511 RIGHT SHOULDER PAIN, UNSPECIFIED CHRONICITY: Primary | ICD-10-CM

## 2020-08-11 PROCEDURE — 71046 X-RAY EXAM CHEST 2 VIEWS: CPT | Performed by: RADIOLOGY

## 2020-08-11 PROCEDURE — 73030 X-RAY EXAM OF SHOULDER: CPT

## 2020-08-11 PROCEDURE — 71046 X-RAY EXAM CHEST 2 VIEWS: CPT

## 2020-08-11 PROCEDURE — 73030 X-RAY EXAM OF SHOULDER: CPT | Performed by: RADIOLOGY

## 2020-08-19 ENCOUNTER — APPOINTMENT (OUTPATIENT)
Dept: GENERAL RADIOLOGY | Facility: HOSPITAL | Age: 70
End: 2020-08-19

## 2020-08-19 ENCOUNTER — HOSPITAL ENCOUNTER (EMERGENCY)
Facility: HOSPITAL | Age: 70
Discharge: HOME | End: 2020-08-19
Attending: EMERGENCY MEDICINE | Admitting: EMERGENCY MEDICINE

## 2020-08-19 VITALS
DIASTOLIC BLOOD PRESSURE: 71 MMHG | HEIGHT: 62 IN | SYSTOLIC BLOOD PRESSURE: 148 MMHG | RESPIRATION RATE: 18 BRPM | BODY MASS INDEX: 32.2 KG/M2 | OXYGEN SATURATION: 97 % | HEART RATE: 70 BPM | WEIGHT: 175 LBS | TEMPERATURE: 98 F

## 2020-08-19 DIAGNOSIS — S90.30XA CONTUSION OF DORSUM OF FOOT: Primary | ICD-10-CM

## 2020-08-19 PROCEDURE — 73630 X-RAY EXAM OF FOOT: CPT | Performed by: RADIOLOGY

## 2020-08-19 PROCEDURE — 73630 X-RAY EXAM OF FOOT: CPT

## 2020-08-19 PROCEDURE — 99283 EMERGENCY DEPT VISIT LOW MDM: CPT

## 2020-08-19 NOTE — ED PROVIDER NOTES
Subjective   69-year-old female presents secondary to right foot injury.  Patient states that she dropped a heavy TV on this approximately an hour ago.  She denies any other injury or complaint.  Her pain is mild to moderate.  She declines anything for pain at this time.  She voices no other complaints at this time.          Review of Systems   Constitutional: Negative.  Negative for fever.   HENT: Negative.    Respiratory: Negative.    Cardiovascular: Negative.  Negative for chest pain.   Gastrointestinal: Negative.  Negative for abdominal pain.   Endocrine: Negative.    Genitourinary: Negative.  Negative for dysuria.   Skin: Negative.    Neurological: Negative.    Psychiatric/Behavioral: Negative.    All other systems reviewed and are negative.      No past medical history on file.    Allergies   Allergen Reactions   • Contrast Dye Anaphylaxis   • Sulfa Antibiotics Anaphylaxis       No past surgical history on file.    No family history on file.    Social History     Socioeconomic History   • Marital status:      Spouse name: Not on file   • Number of children: Not on file   • Years of education: Not on file   • Highest education level: Not on file           Objective   Physical Exam   Constitutional: She is oriented to person, place, and time. She appears well-developed and well-nourished. No distress.   HENT:   Head: Normocephalic and atraumatic.   Right Ear: External ear normal.   Left Ear: External ear normal.   Nose: Nose normal.   Eyes: Pupils are equal, round, and reactive to light. Conjunctivae and EOM are normal.   Neck: Normal range of motion. Neck supple. No JVD present. No tracheal deviation present.   Cardiovascular: Normal rate, regular rhythm and normal heart sounds.   No murmur heard.  Pulmonary/Chest: Effort normal and breath sounds normal. No respiratory distress. She has no wheezes.   Abdominal: Soft. Bowel sounds are normal. There is no tenderness.   Musculoskeletal: Normal range of  motion. She exhibits tenderness (with swelling right dorsal foot. ). She exhibits no edema or deformity.   Neurological: She is alert and oriented to person, place, and time. No cranial nerve deficit.   Skin: Skin is warm and dry. No rash noted. She is not diaphoretic. No erythema. No pallor.   Psychiatric: She has a normal mood and affect. Her behavior is normal. Thought content normal.   Nursing note and vitals reviewed.      Procedures           ED Course                                           [facility]  Number of Diagnoses or Management Options  Contusion of dorsum of foot: new and requires workup     Amount and/or Complexity of Data Reviewed  Tests in the radiology section of CPT®: ordered and reviewed        Final diagnoses:   Contusion of dorsum of foot            Willian Santillan PA  08/19/20 1225

## 2020-10-27 ENCOUNTER — PREP FOR SURGERY (OUTPATIENT)
Dept: OTHER | Facility: HOSPITAL | Age: 70
End: 2020-10-27

## 2020-11-03 ENCOUNTER — OFFICE VISIT (OUTPATIENT)
Dept: ORTHOPEDIC SURGERY | Facility: CLINIC | Age: 70
End: 2020-11-03

## 2020-11-03 VITALS
HEART RATE: 68 BPM | SYSTOLIC BLOOD PRESSURE: 114 MMHG | HEIGHT: 62 IN | WEIGHT: 182 LBS | DIASTOLIC BLOOD PRESSURE: 70 MMHG | BODY MASS INDEX: 33.49 KG/M2 | TEMPERATURE: 97.1 F

## 2020-11-03 DIAGNOSIS — M75.42 IMPINGEMENT SYNDROME OF LEFT SHOULDER: Primary | ICD-10-CM

## 2020-11-03 DIAGNOSIS — M54.2 CERVICALGIA: ICD-10-CM

## 2020-11-03 PROCEDURE — 20610 DRAIN/INJ JOINT/BURSA W/O US: CPT | Performed by: PHYSICIAN ASSISTANT

## 2020-11-03 PROCEDURE — 99203 OFFICE O/P NEW LOW 30 MIN: CPT | Performed by: PHYSICIAN ASSISTANT

## 2020-11-03 RX ORDER — CONJUGATED ESTROGENS 0.62 MG/1
0.62 TABLET, FILM COATED ORAL DAILY
COMMUNITY
Start: 2020-10-04

## 2020-11-03 RX ORDER — MONTELUKAST SODIUM 10 MG/1
10 TABLET ORAL DAILY
COMMUNITY
Start: 2020-10-22

## 2020-11-03 RX ORDER — LOSARTAN POTASSIUM 100 MG/1
100 TABLET ORAL DAILY
COMMUNITY
Start: 2020-08-19

## 2020-11-03 RX ORDER — GABAPENTIN 800 MG/1
800 TABLET ORAL 3 TIMES DAILY
COMMUNITY
Start: 2020-10-22

## 2020-11-03 RX ORDER — CARVEDILOL 25 MG/1
25 TABLET ORAL 2 TIMES DAILY
COMMUNITY
Start: 2020-07-29

## 2020-11-03 RX ORDER — FLAVOXATE HYDROCHLORIDE 100 MG/1
100 TABLET ORAL DAILY
COMMUNITY
Start: 2020-07-27

## 2020-11-03 RX ORDER — PANTOPRAZOLE SODIUM 40 MG/1
40 TABLET, DELAYED RELEASE ORAL DAILY
COMMUNITY
Start: 2020-08-31

## 2020-11-03 RX ORDER — BUMETANIDE 1 MG/1
1 TABLET ORAL DAILY
COMMUNITY
Start: 2020-08-31

## 2020-11-03 RX ORDER — LIDOCAINE HYDROCHLORIDE 10 MG/ML
5 INJECTION, SOLUTION EPIDURAL; INFILTRATION; INTRACAUDAL; PERINEURAL
Status: COMPLETED | OUTPATIENT
Start: 2020-11-03 | End: 2020-11-03

## 2020-11-03 RX ORDER — ALPRAZOLAM 1 MG/1
1 TABLET ORAL 2 TIMES DAILY
COMMUNITY
Start: 2020-09-22

## 2020-11-03 RX ORDER — METHYLPREDNISOLONE ACETATE 80 MG/ML
80 INJECTION, SUSPENSION INTRA-ARTICULAR; INTRALESIONAL; INTRAMUSCULAR; SOFT TISSUE
Status: COMPLETED | OUTPATIENT
Start: 2020-11-03 | End: 2020-11-03

## 2020-11-03 RX ORDER — HYDROCODONE BITARTRATE AND ACETAMINOPHEN 10; 325 MG/1; MG/1
1 TABLET ORAL 2 TIMES DAILY
COMMUNITY
Start: 2020-10-22

## 2020-11-03 RX ADMIN — LIDOCAINE HYDROCHLORIDE 5 ML: 10 INJECTION, SOLUTION EPIDURAL; INFILTRATION; INTRACAUDAL; PERINEURAL at 14:49

## 2020-11-03 RX ADMIN — METHYLPREDNISOLONE ACETATE 80 MG: 80 INJECTION, SUSPENSION INTRA-ARTICULAR; INTRALESIONAL; INTRAMUSCULAR; SOFT TISSUE at 14:49

## 2020-11-03 NOTE — PROGRESS NOTES
New Patient Visit      Patient: Kinjal Landry  YOB: 1950  Date of Encounter: 11/03/2020          History of Present Illness:     Kinjal Landry is a 70 y.o. female evaluated today secondary to left shoulder pain several months duration.  Patient states that in August she had a fall while holding her grandson lying onto her right shoulder.  Patient reports pain to the left shoulder following this.  She reports pain to the superior aspect of shoulder radiating down to the mid upper arm and occasionally to the elbow.  She reports a burning and tingling sensation as well as difficulty and stiffness upon range of motion of her neck.  Patient reports difficulty upon overhead range of motion and rotation of the shoulder.  No improvement with active modification along with anti-inflammatory patient.  Patient has no prior history of injection.           There is no problem list on file for this patient.    Past Medical History:   Diagnosis Date   • COPD (chronic obstructive pulmonary disease) (CMS/[facility])    • Hypertension    • Rheumatoid arthritis (CMS/[facility])      Past Surgical History:   Procedure Laterality Date   • APPENDECTOMY     • CATARACT EXTRACTION     • CHOLECYSTECTOMY     • HERNIA REPAIR     • TONSILLECTOMY AND ADENOIDECTOMY       Social History     Occupational History   • Not on file   Tobacco Use   • Smoking status: Never Smoker   • Smokeless tobacco: Never Used   Substance and Sexual Activity   • Alcohol use: Never     Frequency: Never   • Drug use: Never   • Sexual activity: Defer      Social History     Social History Narrative   • Not on file     Family History   Problem Relation Age of Onset   • Cancer Mother    • Rheumatologic disease Mother    • Osteoarthritis Mother    • Heart disease Father    • Rheumatologic disease Sister    • Osteoarthritis Sister    • Osteoarthritis Maternal Grandmother    • Heart disease Maternal Grandmother    • Heart disease Paternal Grandmother      Current  "Outpatient Medications   Medication Sig Dispense Refill   • potassium chloride (K-DUR,KLOR-CON) 20 MEQ CR tablet Take 1 tablet by mouth Daily. 2 tablet 0   • ALPRAZolam (XANAX) 1 MG tablet Take 1 mg by mouth 2 (two) times a day.     • bumetanide (BUMEX) 1 MG tablet Take 1 mg by mouth Daily.     • carvedilol (COREG) 25 MG tablet Take 25 mg by mouth 2 (two) times a day.     • flavoxATE (URISPAS) 100 MG tablet Take 100 mg by mouth Daily.     • fluticasone (FLOVENT HFA) 110 MCG/ACT inhaler Inhale 1 puff 2 (Two) Times a Day. 12 g 0   • gabapentin (NEURONTIN) 800 MG tablet Take 800 mg by mouth 3 (Three) Times a Day.     • HYDROcodone-acetaminophen (NORCO)  MG per tablet Take 1 tablet by mouth 2 (two) times a day.     • losartan (COZAAR) 100 MG tablet Take 100 mg by mouth Daily.     • montelukast (SINGULAIR) 10 MG tablet Take 10 mg by mouth Daily.     • pantoprazole (PROTONIX) 40 MG EC tablet Take 40 mg by mouth Daily.     • Premarin 0.625 MG tablet Take 0.625 mg by mouth Daily.       No current facility-administered medications for this visit.      Allergies   Allergen Reactions   • Contrast Dye Anaphylaxis   • Sulfa Antibiotics Anaphylaxis            Review of Systems   Constitution: Negative.   HENT: Negative.    Eyes: Negative.    Cardiovascular: Positive for leg swelling.   Respiratory: Negative.    Endocrine: Negative.    Hematologic/Lymphatic: Bruises/bleeds easily.   Skin: Negative.    Musculoskeletal:        Pertinent positives listed in HPI   Gastrointestinal: Negative.    Genitourinary: Positive for hematuria and urgency.   Neurological: Negative.    Psychiatric/Behavioral: Positive for depression. The patient is nervous/anxious.    Allergic/Immunologic: Negative.        Vitals:    11/03/20 1347   BP: 114/70   Pulse: 68   Temp: 97.1 °F (36.2 °C)   Weight: 82.6 kg (182 lb)   Height: 157.5 cm (62\")     Patient's Body mass index is 33.29 kg/m². BMI is above normal parameters. Recommendations include: exercise " counseling and nutrition counseling.    Kinjal Landry  reports that she has never smoked. She has never used smokeless tobacco.. I have educated her on the risk of diseases from using tobacco products such as cancer, COPD and heart disease.           Physical Exam: 70 y.o. female .  General Appearance:    Well appearing, cooperative, in no acute distress.       Patient is alert and oriented x 3.            Musculoskeletal: Examination of patient's left shoulder reveals pain to palpation subacromially with active flexion to 90 degrees and abduction to 100 degrees.  Patient is internal rotation to iliac crest comparable to the lower lumbar region contralateral shoulder.  Patient is with painful drop arm test with strength intact.  Jobes maneuver painful.  Speeds test negative.  Gunter Neer stable.  Yergason's test negative.  Neurovascular intact.        Radiology:   X-ray: Bilateral shoulders reviewed from August 2020 revealed no acute fractures or dislocations noted.  There is no significant degeneration.  Please see full report      Large Joint Arthrocentesis: L subacromial bursa  Date/Time: 11/3/2020 2:49 PM  Consent given by: patient  Site marked: site marked  Timeout: Immediately prior to procedure a time out was called to verify the correct patient, procedure, equipment, support staff and site/side marked as required   Supporting Documentation  Indications: pain and diagnostic evaluation   Procedure Details  Location: shoulder - L subacromial bursa  Needle size: 25 G  Approach: lateral  Medications administered: 80 mg methylPREDNISolone acetate 80 MG/ML; 5 mL lidocaine PF 1% 1 %  Patient tolerance: patient tolerated the procedure well with no immediate complications          Assesment:    ICD-10-CM ICD-9-CM   1. Impingement syndrome of left shoulder  M75.42 726.2   2. Cervicalgia  M54.2 723.1         Plan:    Today the patient was provided with a subacromial Depo-Medrol ejection lidocaine block to symptom  space left shoulder.  Patient tolerated procedure well.  Instructed to return in 3 weeks for evaluation of the efficacy of conservative treatment.  We discussed possibility of formal outpatient physical therapy.  There is no concern for full-thickness tendon tear with retraction.  There is concern for potential cervical radicular component source of her pain symptoms.  The injection is as much diagnostic and therapeutic in reference to the reduction of her pain symptoms.  Patient will continue with activity.    This document was signed by MANDIE Salguero November 3, 2020     CC: Ria Antony APRN

## 2020-11-09 ENCOUNTER — TRANSCRIBE ORDERS (OUTPATIENT)
Dept: ADMINISTRATIVE | Facility: HOSPITAL | Age: 70
End: 2020-11-09

## 2020-11-09 DIAGNOSIS — S93.691A SPRING LIGAMENT TEAR, RIGHT, INITIAL ENCOUNTER: Primary | ICD-10-CM

## 2020-11-09 DIAGNOSIS — M79.671 PAIN IN RIGHT FOOT: ICD-10-CM

## 2020-11-23 ENCOUNTER — HOSPITAL ENCOUNTER (OUTPATIENT)
Dept: CT IMAGING | Facility: HOSPITAL | Age: 70
Discharge: HOME | End: 2020-11-23
Admitting: PODIATRIST

## 2020-11-23 DIAGNOSIS — S93.691A SPRING LIGAMENT TEAR, RIGHT, INITIAL ENCOUNTER: ICD-10-CM

## 2020-11-23 DIAGNOSIS — M79.671 PAIN IN RIGHT FOOT: ICD-10-CM

## 2020-11-23 PROCEDURE — 73700 CT LOWER EXTREMITY W/O DYE: CPT | Performed by: RADIOLOGY

## 2020-11-23 PROCEDURE — 73700 CT LOWER EXTREMITY W/O DYE: CPT

## 2020-12-08 ENCOUNTER — OFFICE VISIT (OUTPATIENT)
Dept: ORTHOPEDIC SURGERY | Facility: CLINIC | Age: 70
End: 2020-12-08

## 2020-12-08 VITALS — HEIGHT: 62 IN | TEMPERATURE: 96.9 F | BODY MASS INDEX: 32.76 KG/M2 | WEIGHT: 178 LBS

## 2020-12-08 DIAGNOSIS — M75.42 IMPINGEMENT SYNDROME OF LEFT SHOULDER: Primary | ICD-10-CM

## 2020-12-08 DIAGNOSIS — M54.2 CERVICALGIA: ICD-10-CM

## 2020-12-08 PROCEDURE — 99213 OFFICE O/P EST LOW 20 MIN: CPT | Performed by: PHYSICIAN ASSISTANT

## 2020-12-08 NOTE — PROGRESS NOTES
"Established Patient Note      Patient: Kinjal Landry  YOB: [birth year]        History of Present Illness:     Kinjal Landry is a 70 y.o. female evaluated today for follow-up left shoulder impingement with cervicalgia.  She was provided a subacromial injection last office visit which he attributes approximate 50% provement of her pain symptoms.  She still has pain to the cervical region the neck radiating down to the left hand at times.  She reports painful range of motion of the shoulder as well as the left upper extremity.  Patient denies any new symptoms.          Physical Exam: 70 y.o. female .  General Appearance:    Well appearing, cooperative, in no acute distress.       Patient is alert and oriented x 3.    Vitals:    12/08/20 1047   Temp: 96.9 °F (36.1 °C)   Weight: 80.7 kg (178 lb)   Height: 157.5 cm (62\")             Musculoskeletal: Evaluation today the patient's left shoulder reveals anterior bicipital groove tenderness subacromial tenderness palpation.  Patient exhibits full intact range of motion of shoulder with rotator cuff tendons evaluated revealing intact strength.  Adequate external/internal rotation.  Patient continues to exhibit painful flexion extension cervical spine.  Cervical crepitus with facet loading pain with left-sided.  Biceps triceps reflexes intact.  Neurovascular is grossly intact left upper extremity.        Assessment:    ICD-10-CM ICD-9-CM   1. Impingement syndrome of left shoulder  M75.42 726.2   2. Cervicalgia  M54.2 723.1       Plan:      Discussion was had with patient in reference to the patient's rotator cuff impingement and no improvement following the injection.  The patient will implement therapy to address the remainder of her symptoms.  Moderate cervical rotation resisted shoulder.  In relation to the patient cervicalgia the patient undergo formal outpatient therapy to address this with cervical traction and modalities aimed at returning normal " cervical gnosis.  Patient return back in 6 weeks for evaluation.     Discussion/summary:            This document was signed by MANDIE Salguero December 8, 2020       CC: Ria Antony APRN

## 2021-01-19 ENCOUNTER — OFFICE VISIT (OUTPATIENT)
Dept: ORTHOPEDIC SURGERY | Facility: CLINIC | Age: 71
End: 2021-01-19

## 2021-01-19 VITALS — BODY MASS INDEX: 33.13 KG/M2 | TEMPERATURE: 96.9 F | WEIGHT: 180 LBS | HEIGHT: 62 IN

## 2021-01-19 DIAGNOSIS — M75.42 IMPINGEMENT SYNDROME OF LEFT SHOULDER: Primary | ICD-10-CM

## 2021-01-19 DIAGNOSIS — M54.2 CERVICALGIA: ICD-10-CM

## 2021-01-19 PROCEDURE — 99213 OFFICE O/P EST LOW 20 MIN: CPT | Performed by: PHYSICIAN ASSISTANT

## 2021-01-19 PROCEDURE — 20610 DRAIN/INJ JOINT/BURSA W/O US: CPT | Performed by: PHYSICIAN ASSISTANT

## 2021-01-19 RX ORDER — LIDOCAINE HYDROCHLORIDE 10 MG/ML
5 INJECTION, SOLUTION EPIDURAL; INFILTRATION; INTRACAUDAL; PERINEURAL
Status: COMPLETED | OUTPATIENT
Start: 2021-01-19 | End: 2021-01-19

## 2021-01-19 RX ORDER — METHYLPREDNISOLONE ACETATE 80 MG/ML
80 INJECTION, SUSPENSION INTRA-ARTICULAR; INTRALESIONAL; INTRAMUSCULAR; SOFT TISSUE
Status: COMPLETED | OUTPATIENT
Start: 2021-01-19 | End: 2021-01-19

## 2021-01-19 RX ADMIN — LIDOCAINE HYDROCHLORIDE 5 ML: 10 INJECTION, SOLUTION EPIDURAL; INFILTRATION; INTRACAUDAL; PERINEURAL at 16:25

## 2021-01-19 RX ADMIN — METHYLPREDNISOLONE ACETATE 80 MG: 80 INJECTION, SUSPENSION INTRA-ARTICULAR; INTRALESIONAL; INTRAMUSCULAR; SOFT TISSUE at 16:25

## 2021-01-19 NOTE — PROGRESS NOTES
[facility] [place] [facility] Established Patient Note      Date:01/19/2021  Patient: Kinjal Landry  YOB: 1950  PCP: Ria Antony APRN      Subjective     Chief Complaint:  Chief Complaint   Patient presents with   • Left Shoulder - Pain, Follow-up, Tingling   • Right Shoulder - Pain, Follow-up         History of Present Illness:     Kinjal Landry is a 70 y.o. female presents today for follow up evaluation bilateral shoulder impingement as well as cervicalgia.  Patient states that she has had some resolution of her pain symptoms with cervicalgia with continued ongoing shoulder pain left worse than right.  She is difficulty with sleep as well as overhead range of motion activities.  She reports no other new complaints.      Past Medical History:   Diagnosis Date   • COPD (chronic obstructive pulmonary disease) (CMS/HCC)    • Hypertension    • Rheumatoid arthritis (CMS/HCC)      Past Surgical History:   Procedure Laterality Date   • APPENDECTOMY     • CATARACT EXTRACTION     • CHOLECYSTECTOMY     • HERNIA REPAIR     • TONSILLECTOMY AND ADENOIDECTOMY       Social History     Occupational History   • Not on file   Tobacco Use   • Smoking status: Never Smoker   • Smokeless tobacco: Never Used   Substance and Sexual Activity   • Alcohol use: Never     Frequency: Never   • Drug use: Never   • Sexual activity: Defer     Kinjal Landry  reports that she has never smoked. She has never used smokeless tobacco.. I have educated her on the risk of diseases from using tobacco products such as cancer, COPD and heart disease.     Review of Systems   Constitution: Negative.   HENT: Negative.    Eyes: Negative.    Cardiovascular: Positive for leg swelling.   Respiratory: Negative.    Endocrine: Negative.    Hematologic/Lymphatic: Bruises/bleeds easily.   Skin: Negative.    Musculoskeletal:        Pertinent positives listed in HPI   Gastrointestinal: Negative.    Genitourinary: Positive for hematuria and urgency.  "  Neurological: Negative.    Psychiatric/Behavioral: Positive for depression. The patient is nervous/anxious.    Allergic/Immunologic: Negative.          Objective      Vitals:    01/19/21 0911   Temp: 96.9 °F (36.1 °C)   Weight: 81.6 kg (180 lb)   Height: 157.5 cm (62\")     Patient's Body mass index is 32.92 kg/m². BMI is above normal parameters. Recommendations include: exercise counseling and nutrition counseling.      Physical Exam  Vitals signs and nursing note reviewed.   Constitutional:       General: She is not in acute distress.     Appearance: She is not ill-appearing.   HENT:      Head: Normocephalic and atraumatic.      Right Ear: External ear normal.      Left Ear: External ear normal.      Nose: Nose normal. No congestion or rhinorrhea.   Eyes:      Extraocular Movements: Extraocular movements intact.      Conjunctiva/sclera: Conjunctivae normal.      Pupils: Pupils are equal, round, and reactive to light.   Neck:      Musculoskeletal: Normal range of motion and neck supple.   Cardiovascular:      Rate and Rhythm: Normal rate.      Pulses: Normal pulses.   Pulmonary:      Effort: Pulmonary effort is normal. No respiratory distress.      Breath sounds: No stridor.   Abdominal:      General: There is no distension.   Musculoskeletal:      Comments:  left shoulder reveals anterior bicipital groove tenderness,subacromial tenderness palpation.  Patient exhibits full intact range of motion of shoulder with rotator cuff tendons evaluated revealing intact strength.  Normal cervical spine examination today.   Skin:     General: Skin is warm and dry.      Capillary Refill: Capillary refill takes less than 2 seconds.   Neurological:      General: No focal deficit present.      Mental Status: She is alert and oriented to person, place, and time.   Psychiatric:         Mood and Affect: Mood normal.         Behavior: Behavior normal.         Thought Content: Thought content normal.         Judgment: Judgment normal. "           Radiology:       EXAMINATION: XR SHOULDER 2+ VIEW BILATERAL-      CLINICAL INDICATION: SHOULDER PAIN; M25.511-Pain in right shoulder;  M25.512-Pain in left shoulder; R06.00-Dyspnea, unspecified        COMPARISON: None available.     FINDINGS: Three views of the right shoulder and 3 views of the left  shoulder reveal no acute fracture or dislocation. There are no blastic  or lytic lesions.     IMPRESSION:  No acute or destructive bony abnormality.      This report was finalized on 8/11/2020 9:46 AM by Dr. Basim Messer MD.          Assessment/Plan        ICD-10-CM ICD-9-CM   1. Impingement syndrome of left shoulder  M75.42 726.2   2. Cervicalgia  M54.2 723.1         Plan/Discussion:    Secondary to the continuation and recurrence of the left shoulder impingement the patient received subacromial injection with Depo-Medrol and lidocaine block.  Patient tolerated procedure well.  She will finish remainder of the therapy treatment and return back in 4 weeks for evaluation of efficacy of conservative treatment.  Once again no direct surgical indication noted    Large Joint Arthrocentesis: L subacromial bursa  Date/Time: 1/19/2021 4:25 PM  Consent given by: patient  Site marked: site marked  Supporting Documentation  Indications: pain and diagnostic evaluation   Procedure Details  Location: shoulder - L subacromial bursa  Needle size: 25 G  Approach: lateral  Medications administered: 80 mg methylPREDNISolone acetate 80 MG/ML; 5 mL lidocaine PF 1% 1 %  Patient tolerance: patient tolerated the procedure well with no immediate complications                        This document was signed by MANDIE Salguero January 19, 2021       CC: Ria Antony APRN EMR Dragon/Transcription disclaimer:  Part of this note may be completed utilizing the dragon speech recognition software. This electronic transcription/translation of spoken language to printed text may contain grammatical errors, random word insertions,  pronoun errors, and incomplete sentences or occasional consequences of the system due to software limitations, ambient noise, and hardware issues.  Any questions or concerns about the content, text, or information contained within the body of this dictation should be directly addressed to the physician for clarification.

## 2021-02-25 DIAGNOSIS — Z23 IMMUNIZATION DUE: ICD-10-CM

## 2021-08-05 ENCOUNTER — HOSPITAL ENCOUNTER (OUTPATIENT)
Dept: GENERAL RADIOLOGY | Facility: HOSPITAL | Age: 71
Discharge: HOME | End: 2021-08-05
Admitting: NURSE PRACTITIONER

## 2021-08-05 ENCOUNTER — TRANSCRIBE ORDERS (OUTPATIENT)
Dept: ADMINISTRATIVE | Facility: HOSPITAL | Age: 71
End: 2021-08-05

## 2021-08-05 DIAGNOSIS — M25.511 RIGHT SHOULDER PAIN, UNSPECIFIED CHRONICITY: ICD-10-CM

## 2021-08-05 DIAGNOSIS — M54.50 LOW BACK PAIN, UNSPECIFIED BACK PAIN LATERALITY, UNSPECIFIED CHRONICITY, UNSPECIFIED WHETHER SCIATICA PRESENT: ICD-10-CM

## 2021-08-05 DIAGNOSIS — M54.50 LOW BACK PAIN, UNSPECIFIED BACK PAIN LATERALITY, UNSPECIFIED CHRONICITY, UNSPECIFIED WHETHER SCIATICA PRESENT: Primary | ICD-10-CM

## 2021-08-05 PROCEDURE — 72072 X-RAY EXAM THORAC SPINE 3VWS: CPT

## 2021-08-05 PROCEDURE — 72110 X-RAY EXAM L-2 SPINE 4/>VWS: CPT

## 2021-08-05 PROCEDURE — 72110 X-RAY EXAM L-2 SPINE 4/>VWS: CPT | Performed by: RADIOLOGY

## 2021-08-05 PROCEDURE — 73030 X-RAY EXAM OF SHOULDER: CPT | Performed by: RADIOLOGY

## 2021-08-05 PROCEDURE — 72050 X-RAY EXAM NECK SPINE 4/5VWS: CPT | Performed by: RADIOLOGY

## 2021-08-05 PROCEDURE — 72072 X-RAY EXAM THORAC SPINE 3VWS: CPT | Performed by: RADIOLOGY

## 2021-08-05 PROCEDURE — 73030 X-RAY EXAM OF SHOULDER: CPT

## 2021-08-05 PROCEDURE — 72050 X-RAY EXAM NECK SPINE 4/5VWS: CPT

## 2022-07-05 ENCOUNTER — TRANSCRIBE ORDERS (OUTPATIENT)
Dept: ADMINISTRATIVE | Facility: HOSPITAL | Age: 72
End: 2022-07-05

## 2022-07-05 ENCOUNTER — HOSPITAL ENCOUNTER (OUTPATIENT)
Dept: GENERAL RADIOLOGY | Facility: HOSPITAL | Age: 72
Discharge: HOME | End: 2022-07-05
Admitting: NURSE PRACTITIONER

## 2022-07-05 DIAGNOSIS — J18.9 PNEUMONIA DUE TO INFECTIOUS ORGANISM, UNSPECIFIED LATERALITY, UNSPECIFIED PART OF LUNG: ICD-10-CM

## 2022-07-05 DIAGNOSIS — J18.9 PNEUMONIA DUE TO INFECTIOUS ORGANISM, UNSPECIFIED LATERALITY, UNSPECIFIED PART OF LUNG: Primary | ICD-10-CM

## 2022-07-05 PROCEDURE — 71046 X-RAY EXAM CHEST 2 VIEWS: CPT

## 2022-07-05 PROCEDURE — 71046 X-RAY EXAM CHEST 2 VIEWS: CPT | Performed by: RADIOLOGY

## 2023-02-24 ENCOUNTER — TELEPHONE (OUTPATIENT)
Dept: ORTHOPEDIC SURGERY | Facility: CLINIC | Age: 73
End: 2023-02-24
Payer: COMMERCIAL

## 2023-02-24 NOTE — TELEPHONE ENCOUNTER
Caller: PARVIN MEJIA    Relationship to patient: SELF    Best call back number: [phone]    Chief complaint: PATIENT IS REQUESTING A F/U APPT. FOR BILATERAL SHOULDERS BUT IS ASKING TO CHANGE PROVIDER FROM OUMAR TO DR. JEREZ. PATIENT SAYS DR. JEREZ DID SURGERY ON HER FIANCE AND SHE JUST WOULD LIKE TO SEE HIM. NOTHING PERSONAL WITH OUMAR.    Type of visit: NEW PROBLEM WITH NEW PROVIDER

## 2023-03-14 DIAGNOSIS — M25.511 BILATERAL SHOULDER PAIN, UNSPECIFIED CHRONICITY: Primary | ICD-10-CM

## 2023-03-14 DIAGNOSIS — M25.512 BILATERAL SHOULDER PAIN, UNSPECIFIED CHRONICITY: Primary | ICD-10-CM

## 2023-03-27 ENCOUNTER — OFFICE VISIT (OUTPATIENT)
Dept: ORTHOPEDIC SURGERY | Facility: CLINIC | Age: 73
End: 2023-03-27
Payer: MEDICARE

## 2023-03-27 ENCOUNTER — HOSPITAL ENCOUNTER (OUTPATIENT)
Dept: GENERAL RADIOLOGY | Facility: HOSPITAL | Age: 73
Discharge: HOME | End: 2023-03-27
Admitting: ORTHOPAEDIC SURGERY
Payer: MEDICARE

## 2023-03-27 VITALS — WEIGHT: 180 LBS | HEIGHT: 62 IN | BODY MASS INDEX: 33.13 KG/M2

## 2023-03-27 DIAGNOSIS — M54.2 NECK PAIN: Primary | ICD-10-CM

## 2023-03-27 DIAGNOSIS — M75.52 SUBACROMIAL BURSITIS OF LEFT SHOULDER JOINT: ICD-10-CM

## 2023-03-27 DIAGNOSIS — M25.512 BILATERAL SHOULDER PAIN, UNSPECIFIED CHRONICITY: ICD-10-CM

## 2023-03-27 DIAGNOSIS — M54.2 NECK PAIN: ICD-10-CM

## 2023-03-27 DIAGNOSIS — M25.511 BILATERAL SHOULDER PAIN, UNSPECIFIED CHRONICITY: ICD-10-CM

## 2023-03-27 PROBLEM — E66.9 OBESITY (BMI 30.0-34.9): Status: ACTIVE | Noted: 2023-03-27

## 2023-03-27 PROCEDURE — 20610 DRAIN/INJ JOINT/BURSA W/O US: CPT | Performed by: ORTHOPAEDIC SURGERY

## 2023-03-27 PROCEDURE — 73030 X-RAY EXAM OF SHOULDER: CPT

## 2023-03-27 PROCEDURE — 72040 X-RAY EXAM NECK SPINE 2-3 VW: CPT | Performed by: RADIOLOGY

## 2023-03-27 PROCEDURE — 73030 X-RAY EXAM OF SHOULDER: CPT | Performed by: RADIOLOGY

## 2023-03-27 PROCEDURE — 99213 OFFICE O/P EST LOW 20 MIN: CPT | Performed by: ORTHOPAEDIC SURGERY

## 2023-03-27 PROCEDURE — 72040 X-RAY EXAM NECK SPINE 2-3 VW: CPT

## 2023-03-27 RX ORDER — SOLIFENACIN SUCCINATE 10 MG/1
10 TABLET, FILM COATED ORAL DAILY
COMMUNITY
Start: 2023-03-23

## 2023-03-27 RX ORDER — VIBEGRON 75 MG/1
1 TABLET, FILM COATED ORAL DAILY
COMMUNITY
Start: 2023-03-15

## 2023-03-27 RX ADMIN — METHYLPREDNISOLONE ACETATE 40 MG: 40 INJECTION, SUSPENSION INTRA-ARTICULAR; INTRALESIONAL; INTRAMUSCULAR; SOFT TISSUE at 10:42

## 2023-03-27 RX ADMIN — LIDOCAINE HYDROCHLORIDE 3 ML: 10 INJECTION, SOLUTION EPIDURAL; INFILTRATION; INTRACAUDAL; PERINEURAL at 10:42

## 2023-03-27 NOTE — PROGRESS NOTES
Follow-up Visit         Patient: Kinjal Landry  YOB: 1950  Date of Encounter: 03/27/2023      Chief  Complaint:   Chief Complaint   Patient presents with   • Left Shoulder - Follow-up, Pain         HPI:  Kinjal Landry, 72 y.o. female presents in follow-up primary complaint of left shoulder and arm pain with new complaint of numbness from her shoulder to her elbow at times.  Reports no recent injuries.  Has had neck problems in the past.  She has not had MRI of her neck as she has implant.  Her past medical history includes rheumatoid arthritis.        Medical History:  Patient Active Problem List   Diagnosis   • Obesity (BMI 30.0-34.9)     Past Medical History:   Diagnosis Date   • COPD (chronic obstructive pulmonary disease)    • Hypertension    • Rheumatoid arthritis          Social History:  Social History     Socioeconomic History   • Marital status:    Tobacco Use   • Smoking status: Never   • Smokeless tobacco: Never   Vaping Use   • Vaping Use: Never used   Substance and Sexual Activity   • Alcohol use: Never   • Drug use: Never   • Sexual activity: Defer         Current Medications:    Current Outpatient Medications:   •  ALPRAZolam (XANAX) 1 MG tablet, Take 1 tablet by mouth 2 (two) times a day., Disp: , Rfl:   •  bumetanide (BUMEX) 1 MG tablet, Take 1 tablet by mouth Daily., Disp: , Rfl:   •  carvedilol (COREG) 25 MG tablet, Take 1 tablet by mouth 2 (two) times a day., Disp: , Rfl:   •  flavoxATE (URISPAS) 100 MG tablet, Take 1 tablet by mouth Daily., Disp: , Rfl:   •  fluticasone (FLOVENT HFA) 110 MCG/ACT inhaler, Inhale 1 puff 2 (Two) Times a Day., Disp: 12 g, Rfl: 0  •  gabapentin (NEURONTIN) 800 MG tablet, Take 1 tablet by mouth 3 (Three) Times a Day., Disp: , Rfl:   •  Gemtesa 75 MG tablet, Take 1 tablet by mouth Daily., Disp: , Rfl:   •  HYDROcodone-acetaminophen (NORCO)  MG per tablet, Take 1 tablet by mouth 2 (two) times a day., Disp: , Rfl:   •  montelukast  (SINGULAIR) 10 MG tablet, Take 1 tablet by mouth Daily., Disp: , Rfl:   •  pantoprazole (PROTONIX) 40 MG EC tablet, Take 1 tablet by mouth Daily., Disp: , Rfl:   •  potassium chloride (K-DUR,KLOR-CON) 20 MEQ CR tablet, Take 1 tablet by mouth Daily., Disp: 2 tablet, Rfl: 0  •  Premarin 0.625 MG tablet, Take 1 tablet by mouth Daily., Disp: , Rfl:   •  solifenacin (VESICARE) 10 MG tablet, Take 1 tablet by mouth Daily. as directed, Disp: , Rfl:   •  losartan (COZAAR) 100 MG tablet, Take 100 mg by mouth Daily. (Patient not taking: Reported on 3/27/2023), Disp: , Rfl:       Allergies:  Allergies   Allergen Reactions   • Contrast Dye (Echo Or Unknown Ct/Mr) Anaphylaxis   • Sulfa Antibiotics Anaphylaxis         Family History:  Family History   Problem Relation Age of Onset   • Cancer Mother    • Rheumatologic disease Mother    • Osteoarthritis Mother    • Heart disease Father    • Rheumatologic disease Sister    • Osteoarthritis Sister    • Osteoarthritis Maternal Grandmother    • Heart disease Maternal Grandmother    • Heart disease Paternal Grandmother          Surgical History:  Past Surgical History:   Procedure Laterality Date   • APPENDECTOMY     • CATARACT EXTRACTION     • CHOLECYSTECTOMY     • HERNIA REPAIR     • TONSILLECTOMY AND ADENOIDECTOMY           Radiology:   XR Shoulder 2+ View Bilateral    Result Date: 3/28/2023    Acromioclavicular joint degenerative change.  This report was finalized on 3/28/2023 7:33 AM by Dr. Landon Francisco MD.      XR Spine Cervical 2 View    Result Date: 3/28/2023    Degenerative change most pronounced at C3-C4 with disc space narrowing and endplate change.  This report was finalized on 3/28/2023 7:32 AM by Dr. Landon Francisco MD.          Radiographs bilateral shoulders obtained today show mild widening of the lateral clavicle left greater than right joint space is preserved glenohumeral joint is unremarkable subacromial space is normal.  Radiographs of cervical spine obtained show  multilevel degenerative disc disease with narrowing disc spaces c34 and c56.    Orthopedic Examination:   Shoulder demonstrates moderate signs of impingement internal rotation limited to L4.  She has minimal tenderness superficial to the AC joint and no increased pain with crossarm adduction.  She demonstrates good strength with Jobes maneuver with mild pain.  Neurovascular exam is grossly intact.    Cervical spine evaluation reveals slightly limited flexion and extension with lateral bending and rotation left symmetrical with the right with mild discomfort.      Assessment & Plan:   72 y.o. female presents with continued left shoulder pain with some component of posterior neck pain with radiographs obtained today compared to radiographs from 2 years ago showing no interval change.  We discussed options and she was then provided subacromial steroid injection Depo-Medrol 40 mg lidocaine block she had almost complete relief of pain and the tingling in her arm seem to improve.  She will return as needed in the future.         Diagnosis Plan   1. Neck pain  XR Spine Cervical 2 View      2. Subacromial bursitis of left shoulder joint              Large Joint Arthrocentesis: L subacromial bursa  Date/Time: 3/27/2023 10:42 AM  Consent given by: patient  Site marked: site marked  Timeout: Immediately prior to procedure a time out was called to verify the correct patient, procedure, equipment, support staff and site/side marked as required   Supporting Documentation  Indications: pain   Procedure Details  Location: shoulder - L subacromial bursa  Preparation: Patient was prepped and draped in the usual sterile fashion  Needle size: 25 G  Approach: lateral  Medications administered: 40 mg methylPREDNISolone acetate 40 MG/ML; 3 mL lidocaine PF 1% 1 %  Patient tolerance: patient tolerated the procedure well with no immediate complications              Cc:  Ria Antony, APRN              This document has been electronically  signed by Renan Marie MD   April 3, 2023 10:41 EDT

## 2023-04-03 RX ORDER — METHYLPREDNISOLONE ACETATE 40 MG/ML
40 INJECTION, SUSPENSION INTRA-ARTICULAR; INTRALESIONAL; INTRAMUSCULAR; SOFT TISSUE
Status: COMPLETED | OUTPATIENT
Start: 2023-03-27 | End: 2023-03-27

## 2023-04-03 RX ORDER — LIDOCAINE HYDROCHLORIDE 10 MG/ML
3 INJECTION, SOLUTION EPIDURAL; INFILTRATION; INTRACAUDAL; PERINEURAL
Status: COMPLETED | OUTPATIENT
Start: 2023-03-27 | End: 2023-03-27

## 2024-01-25 ENCOUNTER — TRANSCRIBE ORDERS (OUTPATIENT)
Dept: ADMINISTRATIVE | Facility: HOSPITAL | Age: 74
End: 2024-01-25
Payer: COMMERCIAL

## 2024-01-25 DIAGNOSIS — J45.909 UNCOMPLICATED ASTHMA, UNSPECIFIED ASTHMA SEVERITY, UNSPECIFIED WHETHER PERSISTENT: Primary | ICD-10-CM

## 2024-02-14 ENCOUNTER — HOSPITAL ENCOUNTER (OUTPATIENT)
Dept: RESPIRATORY THERAPY | Facility: HOSPITAL | Age: 74
Discharge: HOME | End: 2024-02-14
Admitting: NURSE PRACTITIONER
Payer: MEDICARE

## 2024-02-14 DIAGNOSIS — J45.909 UNCOMPLICATED ASTHMA, UNSPECIFIED ASTHMA SEVERITY, UNSPECIFIED WHETHER PERSISTENT: ICD-10-CM

## 2024-02-14 PROCEDURE — 94726 PLETHYSMOGRAPHY LUNG VOLUMES: CPT

## 2024-02-14 PROCEDURE — 94010 BREATHING CAPACITY TEST: CPT

## 2024-02-14 PROCEDURE — 94729 DIFFUSING CAPACITY: CPT

## 2024-09-10 ENCOUNTER — HOSPITAL ENCOUNTER (OUTPATIENT)
Dept: GENERAL RADIOLOGY | Facility: HOSPITAL | Age: 74
Discharge: HOME | End: 2024-09-10
Admitting: NURSE PRACTITIONER
Payer: MEDICARE

## 2024-09-10 ENCOUNTER — TRANSCRIBE ORDERS (OUTPATIENT)
Dept: ADMINISTRATIVE | Facility: HOSPITAL | Age: 74
End: 2024-09-10
Payer: COMMERCIAL

## 2024-09-10 DIAGNOSIS — R05.9 COUGH, UNSPECIFIED TYPE: ICD-10-CM

## 2024-09-10 DIAGNOSIS — R05.9 COUGH, UNSPECIFIED TYPE: Primary | ICD-10-CM

## 2024-09-10 PROCEDURE — 71046 X-RAY EXAM CHEST 2 VIEWS: CPT
